# Patient Record
Sex: FEMALE | Race: BLACK OR AFRICAN AMERICAN | NOT HISPANIC OR LATINO | URBAN - METROPOLITAN AREA
[De-identification: names, ages, dates, MRNs, and addresses within clinical notes are randomized per-mention and may not be internally consistent; named-entity substitution may affect disease eponyms.]

---
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
## 2025-01-31 NOTE — ED PEDIATRIC NURSE NOTE - NS ED NURSE LEVEL OF CONSCIOUSNESS AFFECT
Goal Outcome Evaluation:  Plan of Care Reviewed With: patient           Outcome Evaluation: VSS (see chart), AOx4, rm air, prn pain medication, continue plan of care                              Calm

## 2025-05-15 NOTE — ED PROVIDER NOTE - RECENT EXPOSURE TO
Contacted the patient and she will wait for the xray to be completed. The patient will set up visit if she has further questions or concerns after the xray.    Thank you    Rubi QURESHI RN     none known